# Patient Record
Sex: FEMALE | Race: WHITE | Employment: UNEMPLOYED | URBAN - METROPOLITAN AREA
[De-identification: names, ages, dates, MRNs, and addresses within clinical notes are randomized per-mention and may not be internally consistent; named-entity substitution may affect disease eponyms.]

---

## 2020-07-30 VITALS
DIASTOLIC BLOOD PRESSURE: 66 MMHG | SYSTOLIC BLOOD PRESSURE: 117 MMHG | WEIGHT: 176.13 LBS | BODY MASS INDEX: 27.64 KG/M2 | HEART RATE: 85 BPM | RESPIRATION RATE: 16 BRPM | OXYGEN SATURATION: 98 % | HEIGHT: 67 IN | TEMPERATURE: 98.7 F

## 2020-07-31 ENCOUNTER — VIRTUAL VISIT (OUTPATIENT)
Dept: INTERNAL MEDICINE CLINIC | Age: 25
End: 2020-07-31
Payer: OTHER GOVERNMENT

## 2020-07-31 DIAGNOSIS — G89.29 CHRONIC BILATERAL LOW BACK PAIN WITHOUT SCIATICA: ICD-10-CM

## 2020-07-31 DIAGNOSIS — M54.50 CHRONIC BILATERAL LOW BACK PAIN WITHOUT SCIATICA: ICD-10-CM

## 2020-07-31 DIAGNOSIS — F42.2 MIXED OBSESSIONAL THOUGHTS AND ACTS: ICD-10-CM

## 2020-07-31 DIAGNOSIS — F41.9 ANXIETY AND DEPRESSION: Primary | ICD-10-CM

## 2020-07-31 DIAGNOSIS — F32.A ANXIETY AND DEPRESSION: Primary | ICD-10-CM

## 2020-07-31 PROCEDURE — 99214 OFFICE O/P EST MOD 30 MIN: CPT | Performed by: INTERNAL MEDICINE

## 2020-07-31 RX ORDER — PAROXETINE HYDROCHLORIDE 40 MG/1
1 TABLET, FILM COATED ORAL DAILY
COMMUNITY
Start: 2020-01-15 | End: 2020-07-31

## 2020-07-31 RX ORDER — DULOXETIN HYDROCHLORIDE 30 MG/1
30 CAPSULE, DELAYED RELEASE ORAL DAILY
Qty: 30 CAP | Refills: 0 | Status: SHIPPED | OUTPATIENT
Start: 2020-07-31 | End: 2020-08-18 | Stop reason: SDUPTHER

## 2020-07-31 NOTE — PROGRESS NOTES
Valera Leventhal presents today for   Chief Complaint   Patient presents with    Depression     send text [phone]  / dr. Del Wright       Depression Screening:  3 most recent PHQ Screens 7/31/2020   Little interest or pleasure in doing things More than half the days   Feeling down, depressed, irritable, or hopeless More than half the days   Total Score PHQ 2 4   Trouble falling or staying asleep, or sleeping too much Nearly every day   Feeling tired or having little energy Nearly every day   Poor appetite, weight loss, or overeating More than half the days   Feeling bad about yourself - or that you are a failure or have let yourself or your family down Nearly every day   Trouble concentrating on things such as school, work, reading, or watching TV Nearly every day   Moving or speaking so slowly that other people could have noticed; or the opposite being so fidgety that others notice Several days   Thoughts of being better off dead, or hurting yourself in some way Not at all   PHQ 9 Score 19   How difficult have these problems made it for you to do your work, take care of your home and get along with others Very difficult       Learning Assessment:  No flowsheet data found. Abuse Screening:  No flowsheet data found. Fall Risk  No flowsheet data found. Health Maintenance reviewed and discussed and ordered per Provider. Health Maintenance Due   Topic Date Due    HPV Age 9Y-34Y (1 - 2-dose series) 01/21/2008    PAP AKA CERVICAL CYTOLOGY  01/21/2018   . Coordination of Care:  1. Have you been to the ER, urgent care clinic since your last visit? Hospitalized since your last visit? no    2. Have you seen or consulted any other health care providers outside of the [address] [place] [address] since your last visit? Include any pap smears or colon screening.  no

## 2020-07-31 NOTE — PROGRESS NOTES
I was in my office in [place], [place] while conducting this encounter. The patient is at home. Consent:  Patient and/or patient's healthcare decision maker is aware that this patient-initiated Telehealth encounter is a billable service, with coverage as determined by HIS/Her insurance carrier. Patient is aware that HE/SHE may receive a bill and has provided verbal consent to proceed: Consent has been obtained within the past 12 months with reference to this enounter    This virtual visit was conducted via video chat using [email or web address]    1. Anxiety and depression  The patient is having an acute exacerbation of her anxiety and depression and I believe the Paxil 40 mg may not be helping her and and in fact could be contributing to the tics which have developed over the past 3 weeks. The plan is to wean her off of her Paxil by decreasing her dose by 50% for a week and then by decreasing that total dose by half for another 3 days. She will then start Cymbalta 30 mg daily. I am also sending out a referral to psychiatry. We will also check a TSH. Especially considering that fluctuations in thyroid hormone can cause many of the symptoms. The patient has no signs of suicidal thoughts during my interview or homicidal thoughts. She will need to follow-up with me closely in 4 weeks. - TSH 3RD GENERATION; Future  - DULoxetine (CYMBALTA) 30 mg capsule; Take 1 Cap by mouth daily. Indications: anxiousness associated with depression  Dispense: 30 Cap; Refill: 0    2. Chronic bilateral low back pain without sciatica  Patient has been suffering from lower back pain now for months. She has been seeing a chiropractor who is done all he can do. Imaging has really shown nothing. I am wondering if this is a manifestation of her depression or if it something rheumatological or autoimmune. We will check an HLA-B27 a sed rate and CRP and a rheumatoid factor.   She is to continue OTC analgesics.  - RHEUMATOID FACTOR, IGM  - CRP, HIGH SENSITIVITY  - SED RATE (ESR); Future  - HLA-B27; Future    3. Mixed obsessional thoughts and acts  These tics that she is describing could be a side effect of the Paxil. They could also be signs of impending obsessive-compulsive disorder development in the setting of anxiety and depression. Will refer to psychiatry. Will wean off of Paxil. Will start low-dose Cymbalta. More than 25 minutes were spent on this encounter more than 50% spent in counselling and coordination of care especially regarding the causes of her anxiety and depression and the workup of her back pain    Chief Complaint   Patient presents with    Depression     send text [phone]  / dr. Karissa CONKLIN   This is a 82-year-old female with a history of anxiety and depression who presents today because of worsening symptoms and the development of nervous tics as well as pain in her back. The patient reports that her back pain is been 4 out of 10 she describes as an achy sensation worse when flexing at her hips. She has been seeing a chiropractor who reports that he is done all he can do. Imaging has been normal.  She has no radiation of pain down her legs. NSAIDs do make it slightly better. She has no other associated signs or symptoms and does not have any muscle pain that is unusual joint pain fevers or chills. She also complains of anxiety and depression with she has acutely worsened over the past several weeks. She had been on Paxil 40 mg daily with a fairly good response. She reports that her  just came home from being deployed and this is caused a lot of stress in her life. She also reports the development of what she describes as \"nervous tics\" which come and go. It can include having to wiggle her nose or close her eyes tightly. She has no thoughts of suicide she has no thoughts of hurting herself she has no thoughts of hurting anybody else.   She rates her depression as an 8 out of 1010 being the worst.  Her anxiety is rated at 7 out of 1010 being the worst.  She has been taking her Paxil as prescribed. She also notes she has been obsessing about things more than she should but she is unwilling to go into details regarding what is actually triggering this. She reports she has not had any illnesses recently and admits she does not get quite enough of sleep having to care for a toddler. Current Outpatient Medications on File Prior to Visit   Medication Sig Dispense Refill    [DISCONTINUED] PARoxetine (PaxiL) 40 mg tablet Take 1 Tab by mouth daily. No current facility-administered medications on file prior to visit. Past Medical History:   Diagnosis Date    Anxiety     Depression         Physical Exam  Gen: nad pleasant but anxious, conversant, normal weight  Heent: eomi, sclera anicteric, no facial assymetry  MMM  MS: normal gain, full rom in exposed ext. Skin: no rashes or lesions noted on exposed extr  Neuro: normal gait, no resting tremor  Resp: patient reported  Neuro: normal gait, no resting tremor. Visible tick noted  Psych: anxious affect, depressed mood.     ROS  Gen: no fevers, chills, weight gain, weight loss  HEENT: no vision changes, sore throat, tinnitus or difficulty swallowing  CV: No CP, palpitations or chest pressure  RESP: no sob, wheezing or cough  ABD: no n/v/d   : no dysuria or change in freq  Neuro: no loss of sensation, resting tremors  Nervous ticks  MS: no loss of strength, normal gait, +muscle pain  Skin: no new rashes or lesions

## 2020-08-03 ENCOUNTER — TELEPHONE (OUTPATIENT)
Dept: INTERNAL MEDICINE CLINIC | Age: 25
End: 2020-08-03

## 2020-08-03 DIAGNOSIS — F41.9 ANXIETY AND DEPRESSION: Primary | ICD-10-CM

## 2020-08-03 DIAGNOSIS — F32.A ANXIETY AND DEPRESSION: Primary | ICD-10-CM

## 2020-08-03 NOTE — TELEPHONE ENCOUNTER
Called and said you were going to  call in  Paxil 40 for 5 days to wean her down to the pharmacy , but I thought we did this but I dont see it.

## 2020-08-04 DIAGNOSIS — F41.9 ANXIETY AND DEPRESSION: ICD-10-CM

## 2020-08-04 DIAGNOSIS — F32.A ANXIETY AND DEPRESSION: ICD-10-CM

## 2020-08-04 DIAGNOSIS — G89.29 CHRONIC BILATERAL LOW BACK PAIN WITHOUT SCIATICA: ICD-10-CM

## 2020-08-04 DIAGNOSIS — M54.50 CHRONIC BILATERAL LOW BACK PAIN WITHOUT SCIATICA: ICD-10-CM

## 2020-08-04 RX ORDER — PAROXETINE HYDROCHLORIDE 40 MG/1
TABLET, FILM COATED ORAL
Qty: 4 TAB | Refills: 0 | Status: SHIPPED | OUTPATIENT
Start: 2020-08-04 | End: 2020-08-04 | Stop reason: CLARIF

## 2020-08-04 RX ORDER — PAROXETINE HYDROCHLORIDE 40 MG/1
TABLET, FILM COATED ORAL
Qty: 3 TAB | Refills: 0 | Status: SHIPPED | OUTPATIENT
Start: 2020-08-04 | End: 2020-08-18 | Stop reason: ALTCHOICE

## 2020-08-18 ENCOUNTER — VIRTUAL VISIT (OUTPATIENT)
Dept: INTERNAL MEDICINE CLINIC | Age: 25
End: 2020-08-18
Payer: OTHER GOVERNMENT

## 2020-08-18 DIAGNOSIS — G89.29 CHRONIC LOW BACK PAIN WITH SCIATICA, SCIATICA LATERALITY UNSPECIFIED, UNSPECIFIED BACK PAIN LATERALITY: Primary | ICD-10-CM

## 2020-08-18 DIAGNOSIS — M54.40 CHRONIC LOW BACK PAIN WITH SCIATICA, SCIATICA LATERALITY UNSPECIFIED, UNSPECIFIED BACK PAIN LATERALITY: Primary | ICD-10-CM

## 2020-08-18 DIAGNOSIS — F32.1 CURRENT MODERATE EPISODE OF MAJOR DEPRESSIVE DISORDER, UNSPECIFIED WHETHER RECURRENT (HCC): ICD-10-CM

## 2020-08-18 PROCEDURE — 99442 PR PHYS/QHP TELEPHONE EVALUATION 11-20 MIN: CPT | Performed by: INTERNAL MEDICINE

## 2020-08-18 RX ORDER — NORGESTIMATE AND ETHINYL ESTRADIOL 0.25-0.035
KIT ORAL
COMMUNITY
Start: 2020-07-06

## 2020-08-18 RX ORDER — CYCLOBENZAPRINE HCL 10 MG
TABLET ORAL
COMMUNITY
Start: 2020-05-22 | End: 2020-12-18

## 2020-08-18 RX ORDER — IBUPROFEN 400 MG/1
TABLET ORAL
COMMUNITY
Start: 2020-05-22

## 2020-08-18 RX ORDER — VENLAFAXINE HYDROCHLORIDE 37.5 MG/1
37.5 CAPSULE, EXTENDED RELEASE ORAL DAILY
Qty: 30 CAP | Refills: 1 | Status: SHIPPED | OUTPATIENT
Start: 2020-08-18 | End: 2020-08-21 | Stop reason: DRUGHIGH

## 2020-08-18 NOTE — PROGRESS NOTES
I was at my office in [place], [place] while conducting this encounter. The patient is at home. Consent:  Patient and/or HIS/HER healthcare decision maker is aware that this patient-initiated Telehealth encounter is a billable service, with coverage as determined by patient's insurance carrier. Patient is aware that He/She may receive a bill and has provided verbal consent to proceed: Consent has been obtained within past 12 months of the date of this encounter. This virtual visit was conducted via Telephone    1. Chronic low back pain with sciatica, sciatica laterality unspecified, unspecified back pain laterality  - REFERRAL TO ORTHOPEDICS  Given that this is been going on for several months now will refer to orthopedics to get their input. I do question as to whether or not she may benefit from a steroid injection    2. Current moderate episode of major depressive disorder, unspecified whether recurrent ([facility] [address]  Clearly she is having some side effects from the Cymbalta. We will go ahead and try Effexor 37.5 mg she will need follow-up with with me in 4 to 6 weeks  - venlafaxine-SR (EFFEXOR-XR) 37.5 mg capsule; Take 1 Cap by mouth daily. Dispense: 30 Cap; Refill: 1       Chief Complaint   Patient presents with    Medication Evaluation     cymbalta , not helping making her feel worse    Back Pain     lower back shooting in both legs        HPI   This is a 72-year-old female seen by me in May due to depression due to her current situation at home and dealing with family. She is also suffered from chronic back pain for several months. We attempted a Medrol Dosepak Flexeril and ibuprofen which did help a little bit but she is continued to have lower back pain with radiation sometimes down into her legs. She saw chiropractor who reportedly ordered spinal films which showed a misalignment. I do not have access to those films.   She is walking without any difficuly but lifting heavy things set off her lower back.    Current Outpatient Medications on File Prior to Visit   Medication Sig Dispense Refill    ibuprofen (MOTRIN) 400 mg tablet TK 1 T PO  EVERY 4 HOURS      cyclobenzaprine (FLEXERIL) 10 mg tablet TK 1 T PO  TID      Estarylla 0.25-35 mg-mcg tab TK 1 T PO QD FOR BIRTH CONTROL      [DISCONTINUED] PARoxetine (PAXIL) 40 mg tablet Corrected si/2 tab for four days, then 1/4 tab for three days. (sorry, new epic system) this replaces the prev. Sig and Rx  Indications: anxiousness associated with depression 3 Tab 0    [DISCONTINUED] DULoxetine (CYMBALTA) 30 mg capsule Take 1 Cap by mouth daily. Indications: anxiousness associated with depression 30 Cap 0     No current facility-administered medications on file prior to visit.          Past Medical History:   Diagnosis Date    Anxiety     Depression              Total Time Spent on this Encounter: 15 minutes

## 2020-08-18 NOTE — PROGRESS NOTES
Jerome Do presents today for   Chief Complaint   Patient presents with    Medication Evaluation     cymbalta , not helping making her feel worse    Back Pain     lower back shooting in both legs       Depression Screening:  3 most recent PHQ Screens 8/18/2020   Little interest or pleasure in doing things Several days   Feeling down, depressed, irritable, or hopeless Several days   Total Score PHQ 2 2   Trouble falling or staying asleep, or sleeping too much More than half the days   Feeling tired or having little energy Several days   Poor appetite, weight loss, or overeating -   Feeling bad about yourself - or that you are a failure or have let yourself or your family down Not at all   Trouble concentrating on things such as school, work, reading, or watching TV Several days   Moving or speaking so slowly that other people could have noticed; or the opposite being so fidgety that others notice Several days   Thoughts of being better off dead, or hurting yourself in some way Not at all   PHQ 9 Score -   How difficult have these problems made it for you to do your work, take care of your home and get along with others Somewhat difficult       Learning Assessment:  Learning Assessment 8/18/2020   PRIMARY LEARNER Patient   HIGHEST LEVEL OF EDUCATION - PRIMARY LEARNER  GRADUATED HIGH SCHOOL OR GED   PRIMARY LANGUAGE ENGLISH   LEARNER PREFERENCE PRIMARY DEMONSTRATION   ANSWERED BY patient   RELATIONSHIP SELF         Health Maintenance reviewed and discussed and ordered per Provider. Health Maintenance Due   Topic Date Due    HPV Age 9Y-34Y (1 - 2-dose series) 01/21/2008    PAP AKA CERVICAL CYTOLOGY  01/21/2018    Influenza Age 5 to Adult  08/01/2020   . Coordination of Care:  1. Have you been to the ER, urgent care clinic since your last visit? Hospitalized since your last visit? no    2.  Have you seen or consulted any other health care providers outside of the [address] [place] [address] since your last visit? Include any pap smears or colon screening.  No

## 2020-08-21 ENCOUNTER — TELEPHONE (OUTPATIENT)
Dept: INTERNAL MEDICINE CLINIC | Age: 25
End: 2020-08-21

## 2020-08-21 DIAGNOSIS — F32.A ANXIETY AND DEPRESSION: Primary | ICD-10-CM

## 2020-08-21 DIAGNOSIS — F41.9 ANXIETY AND DEPRESSION: Primary | ICD-10-CM

## 2020-08-21 RX ORDER — PAROXETINE HYDROCHLORIDE 20 MG/1
TABLET, FILM COATED ORAL
Qty: 46 TAB | Refills: 0 | Status: SHIPPED | OUTPATIENT
Start: 2020-08-21 | End: 2020-12-18 | Stop reason: DRUGHIGH

## 2020-08-21 NOTE — TELEPHONE ENCOUNTER
Patient called and stated that she cant take the medication that you changed her to the venlafaxine 37.5mg it makes her feel worst says it has increased her depression and her suicidal thoughts  she would rather go back to her paxcil

## 2020-08-21 NOTE — TELEPHONE ENCOUNTER
This will be the third dose adjustment for her. i'm going to refer her to psychiatry. Will restart paxil at 20mg/day for 14 days and then increase to her original dose of 40mg after that. She has been advised previously to d/c any meds that make her more depressed. Addendum  Spoke to patient who was sobbing. She is admitting to suicidal thoughts although without a current plan. I have asked her to go to the ER immediately. She has agreed that she will go. She will call us from the hospital and my plan will be to talk to the ER staff. I believe she needs IP medical stabalization at this point.  If we do not hear from her, we will be forced to call EMS

## 2020-09-08 ENCOUNTER — OFFICE VISIT (OUTPATIENT)
Dept: ORTHOPEDIC SURGERY | Age: 25
End: 2020-09-08
Payer: OTHER GOVERNMENT

## 2020-09-08 VITALS — HEIGHT: 67 IN | BODY MASS INDEX: 26.68 KG/M2 | WEIGHT: 170 LBS

## 2020-09-08 DIAGNOSIS — M54.50 ACUTE LOW BACK PAIN, UNSPECIFIED BACK PAIN LATERALITY, UNSPECIFIED WHETHER SCIATICA PRESENT: Primary | ICD-10-CM

## 2020-09-08 PROCEDURE — 99213 OFFICE O/P EST LOW 20 MIN: CPT | Performed by: ORTHOPAEDIC SURGERY

## 2020-09-08 NOTE — PATIENT INSTRUCTIONS
The patient's back is neurovascularly intact and appears to have good symmetry on exam with no muscle atrophy noted. Normal curvature of the spine with positive tenderness to palpation. Decreased range of motion in all planes secondary to pain but normal strength. No rashes, echymosis or other skin lesions noted. The patients bilateral lower extremities are grossly neurovascularly intact with good cap refill, full range of motion and strength. No swelling, echymosis or instability noted. Negative straight leg raise, negative beasley's and negative lachman's. No tenderness to palpation. No foot drop present and patient has a normal gait.

## 2020-09-08 NOTE — PROGRESS NOTES
Name: Ryan Hassan    : 1995  Service Dept: [address] [place] [address] MEDICINE       Chief Complaint   Patient presents with    Back Pain        Patient's Pharmacies:    [facility] [address], [address] [place], [place] [id number] [facility] [address]  Phone: [phone] Fax: [phone]       Visit Vitals  Ht 5' 7\" (1.702 m)   Wt 170 lb (77.1 kg)   BMI 26.63 kg/m²        Allergies   Allergen Reactions    Latex Unknown (comments)        Current Outpatient Medications   Medication Sig Dispense Refill    PARoxetine (PAXIL) 20 mg tablet 1 tab daily for 14 days then 2 tabs daily thereafter. Indications: anxiousness associated with depression 46 Tab 0    ibuprofen (MOTRIN) 400 mg tablet TK 1 T PO  EVERY 4 HOURS      cyclobenzaprine (FLEXERIL) 10 mg tablet TK 1 T PO  TID      Estarylla 0.25-35 mg-mcg tab TK 1 T PO QD FOR BIRTH CONTROL          There is no problem list on file for this patient. Family History   Problem Relation Age of Onset    Other Father     Cancer Sister         Social History     Socioeconomic History    Marital status:      Spouse name: Not on file    Number of children: Not on file    Years of education: Not on file    Highest education level: Not on file   Tobacco Use    Smoking status: Never Smoker    Smokeless tobacco: Never Used   Substance and Sexual Activity    Alcohol use: Yes     Comment: occasional        No past surgical history on file. Past Medical History:   Diagnosis Date    Anxiety     Depression         I have reviewed and agree with [address] and ROS and intake form in chart and the record. Review of Systems:   Patient is a pleasant appearing individual, appropriately dressed, well hydrated, well nourished, who is alert, appropriately oriented for age, and in no acute distress with a normal gait and normal affect who does not appear to be in any significant pain. Physical Exam:  The patient's back is neurovascularly intact and appears to have good symmetry on exam with no muscle atrophy noted. Normal curvature of the spine with positive tenderness to palpation. Decreased range of motion in all planes secondary to pain but normal strength. No rashes, echymosis or other skin lesions noted. The patients bilateral lower extremities are grossly neurovascularly intact with good cap refill, full range of motion and strength. No swelling, echymosis or instability noted. Negative straight leg raise, negative beasley's and negative lachman's. No tenderness to palpation. No foot drop present and patient has a normal gait. Encounter Diagnoses     ICD-10-CM ICD-9-CM   1. Acute low back pain, unspecified back pain laterality, unspecified whether sciatica present  M54.5 724.2          Scribed by Hanny Galvez LPN as dictated by [facility] CLARITA Albright MD.    HPI:  The patient is here with a chief complaint of low back pain, throbbing burning pain, progressively getting worse. Nothing has helped. Pain is 7/10. ROS:  10-point review of systems is positive for nighttime pain. X-rays of the lumbar spine are positive for arthritic changes in the past with a small old sacral fracture. Assessment/Plan:  Plan at this point we will have her see a spine specialist.  We will see the patient back as-needed in the meantime and go from there. No restrictions from my standpoint. Return to Office: Follow-up Information    None             Documentation True and Accepted Rui Albright MD

## 2020-09-10 RX ORDER — PAROXETINE HYDROCHLORIDE 40 MG/1
40 TABLET, FILM COATED ORAL DAILY
Qty: 5 TAB | Refills: 0 | Status: SHIPPED | OUTPATIENT
Start: 2020-09-10 | End: 2020-12-18 | Stop reason: SDUPTHER

## 2020-11-17 ENCOUNTER — TELEPHONE (OUTPATIENT)
Dept: INTERNAL MEDICINE CLINIC | Age: 25
End: 2020-11-17

## 2020-11-17 NOTE — TELEPHONE ENCOUNTER
Patient called and would like a refill on her buspirone 10 mg tab sent to [facility] in [place], [address] says she recently moved and has not changed her doctor yet

## 2020-11-19 NOTE — TELEPHONE ENCOUNTER
Just making sure that you see this patient has moved to [facility] and has not gotten a new provider. Do you still want to fill with 1 refill?

## 2020-12-14 ENCOUNTER — TELEPHONE (OUTPATIENT)
Dept: INTERNAL MEDICINE CLINIC | Age: 25
End: 2020-12-14

## 2020-12-14 NOTE — TELEPHONE ENCOUNTER
Patient called says she needs a refill on her PARoxetine 40 mg tab and buspor 10 mg tab sent to [address] she has moved there and has not gotten a doctor yet

## 2020-12-18 ENCOUNTER — VIRTUAL VISIT (OUTPATIENT)
Dept: INTERNAL MEDICINE CLINIC | Age: 25
End: 2020-12-18
Payer: OTHER GOVERNMENT

## 2020-12-18 DIAGNOSIS — F41.9 ANXIETY AND DEPRESSION: Primary | ICD-10-CM

## 2020-12-18 DIAGNOSIS — F32.A ANXIETY AND DEPRESSION: Primary | ICD-10-CM

## 2020-12-18 PROCEDURE — 99442 PR PHYS/QHP TELEPHONE EVALUATION 11-20 MIN: CPT | Performed by: INTERNAL MEDICINE

## 2020-12-18 RX ORDER — PAROXETINE HYDROCHLORIDE 40 MG/1
40 TABLET, FILM COATED ORAL DAILY
Qty: 30 TAB | Refills: 3 | Status: SHIPPED | OUTPATIENT
Start: 2020-12-18 | End: 2021-04-29

## 2020-12-18 RX ORDER — BUSPIRONE HYDROCHLORIDE 10 MG/1
10 TABLET ORAL 2 TIMES DAILY
COMMUNITY
Start: 2020-08-24 | End: 2020-12-18 | Stop reason: SDUPTHER

## 2020-12-18 RX ORDER — BUSPIRONE HYDROCHLORIDE 10 MG/1
10 TABLET ORAL 2 TIMES DAILY
Qty: 60 TAB | Refills: 3 | Status: SHIPPED | OUTPATIENT
Start: 2020-12-18

## 2020-12-18 NOTE — PROGRESS NOTES
Socrates Foster presents today for   Chief Complaint   Patient presents with    Medication Refill       Depression Screening:  3 most recent PHQ Screens 12/18/2020   PHQ Not Done Active Diagnosis of Depression or Bipolar Disorder   Little interest or pleasure in doing things Not at all   Feeling down, depressed, irritable, or hopeless Several days   Total Score PHQ 2 1   Trouble falling or staying asleep, or sleeping too much -   Feeling tired or having little energy -   Poor appetite, weight loss, or overeating -   Feeling bad about yourself - or that you are a failure or have let yourself or your family down -   Trouble concentrating on things such as school, work, reading, or watching TV -   Moving or speaking so slowly that other people could have noticed; or the opposite being so fidgety that others notice -   Thoughts of being better off dead, or hurting yourself in some way -   PHQ 9 Score -   How difficult have these problems made it for you to do your work, take care of your home and get along with others -       Learning Assessment:  Learning Assessment 8/18/2020   PRIMARY LEARNER Patient   HIGHEST LEVEL OF EDUCATION - PRIMARY LEARNER  GRADUATED HIGH SCHOOL OR GED   PRIMARY LANGUAGE ENGLISH   LEARNER PREFERENCE PRIMARY DEMONSTRATION   ANSWERED BY patient   RELATIONSHIP SELF       Health Maintenance reviewed and discussed and ordered per Provider. Health Maintenance Due   Topic Date Due    HPV Age 9Y-34Y (1 - 2-dose series) 01/21/2008    DTaP/Tdap/Td series (1 - Tdap) 01/21/2018    PAP AKA CERVICAL CYTOLOGY  01/21/2018    Flu Vaccine (1) 09/01/2020   . Coordination of Care:  1. Have you been to the ER, urgent care clinic since your last visit? Hospitalized since your last visit? no    2. Have you seen or consulted any other health care providers outside of the [address] [place] [address] since your last visit? Include any pap smears or colon screening.  no

## 2020-12-18 NOTE — PROGRESS NOTES
I was at my office in [place], [place] while conducting this encounter. The patient is at home. Consent:  Patient and/or HIS/HER healthcare decision maker is aware that this patient-initiated Telehealth encounter is a billable service, with coverage as determined by patient's insurance carrier. Patient is aware that He/She may receive a bill and has provided verbal consent to proceed: Consent has been obtained within past 12 months of the date of this encounter. This virtual visit was conducted via Telephone    1. Anxiety and depression  She reports her anxiety and depression is stable. She has moved to [facility] and is in the process of finding a new PCP. I am going to go ahead and refill her BuSpar and Paxil and give her 3 refills. That should give her 4 months to find a new PCP  - PARoxetine (PaxiL) 40 mg tablet; Take 1 Tab by mouth daily. Dispense: 30 Tab; Refill: 3  - busPIRone (BUSPAR) 10 mg tablet; Take 1 Tab by mouth two (2) times a day. Dispense: 60 Tab; Refill: 3       Chief Complaint   Patient presents with    Medication Refill        HPI   This is a very pleasant 53-year-old female who presents for refills. She has a history of depression for which I have been treating for the past year. She reports she is in good spirits but moving to [facility] has caused its own set of anxieties. She has no suicidal ideation or homicidal ideation. She reports she is still little bit down but she is more anxious than anything else. She ran out of her BuSpar weeks ago. She has no nausea vomiting diarrhea chest pain or shortness of breath. She reports she feels safe at home and is getting along with her . Current Outpatient Medications on File Prior to Visit   Medication Sig Dispense Refill    ibuprofen (MOTRIN) 400 mg tablet TK 1 T PO  EVERY 4 HOURS      Estarylla 0.25-35 mg-mcg tab TK 1 T PO QD FOR BIRTH CONTROL       No current facility-administered medications on file prior to visit.          There is no problem list on file for this patient.             Total Time Spent on this Encounter: 12 minutes spent

## 2021-04-29 DIAGNOSIS — F32.A ANXIETY AND DEPRESSION: ICD-10-CM

## 2021-04-29 DIAGNOSIS — F41.9 ANXIETY AND DEPRESSION: ICD-10-CM

## 2021-04-29 RX ORDER — PAROXETINE HYDROCHLORIDE 40 MG/1
TABLET, FILM COATED ORAL
Qty: 30 TAB | Refills: 3 | Status: SHIPPED | OUTPATIENT
Start: 2021-04-29

## 2021-11-04 NOTE — TELEPHONE ENCOUNTER
Patient was seen on 8/18/20. I do not see Buspar in this patients chart and I went back to [facility] and saw that the patient reported she was on Buspar in Jan of 2019 but no refills and such were given on it. Recon of meds shows that Buspar was given to patient on 08/24/20 with [facility] as source. Please advise. Alert-The patient is alert, awake and responds to voice. The patient is oriented to time, place, and person. The triage nurse is able to obtain subjective information.